# Patient Record
Sex: FEMALE | Race: OTHER | Employment: UNEMPLOYED | URBAN - METROPOLITAN AREA
[De-identification: names, ages, dates, MRNs, and addresses within clinical notes are randomized per-mention and may not be internally consistent; named-entity substitution may affect disease eponyms.]

---
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
## 2024-09-30 ENCOUNTER — TELEPHONE (OUTPATIENT)
Age: 19
End: 2024-09-30

## 2024-09-30 NOTE — TELEPHONE ENCOUNTER
----- Message from Helen JOSEPH sent at 9/30/2024  2:52 PM EDT -----  Regarding: FW: [facility] Appointment Request    ----- Message -----  From: Lorena Sellers  Sent: 9/30/2024   2:23 PM EDT  To: [facility] Clinical Staff  Subject: [facility] Appointment Request                          [facility] Appointment Request    Patient needs appointment for [facility] Appointment Type: New Patient.    Patient Requested Dates(s): as soon as possible  Patient Requested Time: any time  Provider Name: any available providers preferably female    Reason for Appointment Request: New Patient - Available appointments did not meet patient need  --------------------------------------------------------------------------------------------------------------------------    Relationship to Patient: Self     Call Back Information: OK to leave message on voicemail  Preferred Call Back Number: Phone [phone]

## 2024-09-30 NOTE — TELEPHONE ENCOUNTER
Informed pt the soonest New Pt Appt that I had was not until 12/17/24 and it was with [facility],and if she wanted a female the first available with [facility] was mid January.\"Pt stated she will just keep her New Pt Appt at [facility] with Raymond Andres.\"